# Patient Record
Sex: MALE | Race: WHITE
[De-identification: names, ages, dates, MRNs, and addresses within clinical notes are randomized per-mention and may not be internally consistent; named-entity substitution may affect disease eponyms.]

---

## 2018-02-04 ENCOUNTER — HOSPITAL ENCOUNTER (OUTPATIENT)
Dept: HOSPITAL 80 - FED | Age: 53
Setting detail: OBSERVATION
LOS: 2 days | Discharge: HOME | End: 2018-02-06
Attending: INTERNAL MEDICINE | Admitting: INTERNAL MEDICINE
Payer: COMMERCIAL

## 2018-02-04 DIAGNOSIS — J10.1: Primary | ICD-10-CM

## 2018-02-04 DIAGNOSIS — F17.200: ICD-10-CM

## 2018-02-04 DIAGNOSIS — J44.1: ICD-10-CM

## 2018-02-04 DIAGNOSIS — F20.9: ICD-10-CM

## 2018-02-04 DIAGNOSIS — J96.01: ICD-10-CM

## 2018-02-04 LAB — PLATELET # BLD: 121 10^3/UL (ref 150–400)

## 2018-02-04 PROCEDURE — G0378 HOSPITAL OBSERVATION PER HR: HCPCS

## 2018-02-04 PROCEDURE — 71046 X-RAY EXAM CHEST 2 VIEWS: CPT

## 2018-02-04 NOTE — EDPHY
H & P


Stated Complaint: resp diff.


Source: Patient, Family (Mother)


Exam Limitations: No limitations





- Personal History


Current Tetanus/Diphtheria Vaccine: Unsure


Current Tetanus Diphtheria and Acellular Pertussis (TDAP): Unsure





- Medical/Surgical History


Hx Asthma: No


Hx Chronic Respiratory Disease: Yes


Hx Diabetes: No


Hx Cardiac Disease: No


Hx Renal Disease: No


Hx Cirrhosis: No


Hx Alcoholism: No


Hx HIV/AIDS: No


Hx Splenectomy or Spleen Trauma: No


Other PMH: COPD, Bipolar





- Social History


Smoking Status: Heavy smoker


HPI/ROS: 


HPI:  This is a 52-year-old male who presents with





Chief Complaint:  Respiratory infection





Location:  Chest


Quality:  Respiratory infection


Duration:  5 days


Signs and Symptoms:  + shortness of breath at rest, + shortness of breath on 

exertion, + productive cough, no chest pain, no palpitations, no lower 

extremity edema, + wheezing, no orthopnea, no paroxysmal nocturnal dyspnea, no 

fever, no injury/trauma, no hemoptysis, no body aches, no neck stiffness, + 

fatigue


Timing:  Worsening


Severity:  Moderate


Context:  Patient is a heavy tobacco user presents with 5 day history of 

worsening productive coughing episodes, dyspnea and generalized fatigue.  

Denies any chest pain/palpitations/lower extremity edema. Has an Albuterol 

inhaler at home but does not use consistently as he reports that "it does not 

work."  Does not see a physician regularly.  Did not receive influenza 

vaccination this year.  Reports generalized malaise and decreased appetite.  

Mother reports he only drinks coffee daily.  Patient's biggest complaint is the 

inability to cough up mucus.  No official diagnosis of COPD.  No prior 

hospitalizations for COPD exacerbation and no previous intubations secondary to 

respiratory distress.


Modifying Factors:  None





Comment: 








ROS: see HPI


Constitutional: No fever, + chills, no weight loss


Eyes:  No blurred vision


Respiratory:  + shortness of breath,+ cough


Cardiovascular:  No chest pain, no palpitations, no lower extremity edema 


Gastrointestinal:  No nausea, no vomiting, no diarrhea 


Genitourinary:  No dysuria 


Extremities:  No myalgias 


Neurologic:  No weakness, no numbness


Skin:  No rashes


Hematologic:  No bruising, no bleeding





MEDICAL/SURGICAL/SOCIAL HISTORY: 


Medical history:  Tobacco user.  Does not take any regular medications.


Surgical history:  Denies


Social history:  Mother at bedside. 








CONSTITUTIONAL:  Adult white male who appears older than stated age, smells 

heavily of tobacco, awake and alert, no obvious distress


HEENT: Atraumatic and normocephalic, PERRL, EOMI. Tympanic membranes clear. 

Oropharynx clear, no exudate and moist pink mucosa.  Airway patent.  No 

lymphadenopathy.  No meningismus.


Cardiovascular: Normal S1/S2, tachycardia, regular rhythm, without murmur rub 

or gallop.


PULMONARY/CHEST:  Symmetrical and nontender. Tachypnea; faint expiratory 

wheezing heard throughout.  Purse lipped breathing; prolonged expiratory phase.

  fair air movement. No accessory muscle usage.


ABDOMEN:  Soft, nondistended, nontender, no rebound, no guarding, no peritoneal 

signs, no masses or organomegaly. No CVAT.


EXTREMITIES:  2/2 pulses, strength 5/5, no deformities, no clubbing, no 

cyanosis or edema. No calf tenderness. 


NEUROLOGICAL: no focal neuro deficits.  GCS 15.


SKIN: Warm and dry, no erythema. no rash.  Good capillary refill. 


  


 (Lauryn Liu)


Constitutional: 





 Initial Vital Signs











Temperature (C)  37.6 C   02/04/18 22:43


 


Heart Rate  142 H  02/04/18 22:43


 


Respiratory Rate  20   02/04/18 22:43


 


Blood Pressure  120/82 H  02/04/18 22:43


 


O2 Sat (%)  89 L  02/04/18 22:43








 











O2 Delivery Mode               Nasal Cannula


 


O2 (L/minute)                  3














Allergies/Adverse Reactions: 


 





haloperidol [From Haldol] Allergy (Verified 02/04/18 22:45)


 











Medical Decision Making





- Diagnostics


Imaging Results: 





 Imaging Impressions





Chest X-Ray  02/04/18 23:14


Impression: Mild airways disease. No pneumonia or effusion..  











ED Course/Re-evaluation: 


Labs, IV fluids, nebulizer therapy, IV medications, influenza, chest x-ray 

ordered


Vital signs reviewed upon arrival:  O2 sats 88% on room air with a heart rate 

of 140


Placed on 3 L nasal cannula. 


Will evaluate for influenza versus COPD exacerbation versus pneumonia


Patient given 3 L normal saline, IV Solu-Medrol, DuoNeb, Tessalon Perles and 

p.o. Norco with minimal relief


Reassessed patient 1 hr later and heart rate now 115-120. 


Chest x-ray my read shows no signs of opacity, effusion, pneumothorax, widened 

mediastinum.


Influenza B positive:  Tamiflu candidate. 


Patient removed from 3 L nasal cannula; dropped down to 85% on room air at 

rest.   


Patient has [facility] but prefers to stay at our facility.  Called [facility] and they 

notify me that patient has the type of insurance were he can choose to go to 

any facility and he may remain at [facility]. 


0015: ED to consult for admission:  Spoke with Dr. Ovalles who kindly 

agrees to admit patient and provide further care. 











This patient was seen under the supervision of my secondary supervising 

physician.  I evaluated care for this patient independently.  











 (Lauryn Liu)


PHYSICIAN DOCUMENTATION:


The patient was evaluated and managed by the Physician Assistant.  My co-

signature indicates that I have reviewed this chart and I agree with the 

findings and plan of care as documented.  I am the secondary supervising 

physician.





 (Kortney Carcamo)


Differential Diagnosis: 


Shortness of breath including but not limited to pulmonary infectious process, 

COPD, asthma, pulmonary embolus and congestive heart failure.


 (Lauryn Liu)





- Data Points


Laboratory Results: 





 Laboratory Results





 02/04/18 23:15 





 02/04/18 23:15 





 











  02/04/18 02/04/18 02/04/18





  23:16 23:15 23:15


 


WBC      





    


 


RBC      





    


 


Hgb      





    


 


Hct      





    


 


MCV      





    


 


MCH      





    


 


MCHC      





    


 


RDW      





    


 


Plt Count      





    


 


MPV      





    


 


Neut % (Auto)      





    


 


Lymph % (Auto)      





    


 


Mono % (Auto)      





    


 


Eos % (Auto)      





    


 


Baso % (Auto)      





    


 


Nucleat RBC Rel Count      





    


 


Absolute Neuts (auto)      





    


 


Absolute Lymphs (auto)      





    


 


Absolute Monos (auto)      





    


 


Absolute Eos (auto)      





    


 


Absolute Basos (auto)      





    


 


Absolute Nucleated RBC      





    


 


Immature Gran %      





    


 


Immature Gran #      





    


 


VBG Lactic Acid    0.7 mmol/L mmol/L  





    (0.7-2.1)  


 


Sodium      133 mEq/L L mEq/L





     (135-145) 


 


Potassium      4.5 mEq/L mEq/L





     (3.5-5.2) 


 


Chloride      96 mEq/L L mEq/L





     () 


 


Carbon Dioxide      25 mEq/l mEq/l





     (22-31) 


 


Anion Gap      12 mEq/L mEq/L





     (8-16) 


 


BUN      21 mg/dL mg/dL





     (7-23) 


 


Creatinine      1.3 mg/dL mg/dL





     (0.7-1.3) 


 


Estimated GFR      58 





    


 


Glucose      118 mg/dL H mg/dL





     () 


 


Calcium      8.6 mg/dL mg/dL





     (8.5-10.4) 


 


Troponin I      < 0.012 ng/mL ng/mL





     (0.000-0.034) 


 


NT-Pro-B Natriuret Pep      42 pg/mL pg/mL





     (0-125) 


 


Nasal Influenza A PCR  NEGATIVE FOR FLU A     





   (NEGATIVE)   


 


Nasal Influenza B PCR  FLU B DETECTED  H     





   (NEGATIVE)   














  02/04/18





  23:15


 


WBC  3.92 10^3/uL 10^3/uL





   (3.80-9.50) 


 


RBC  4.66 10^6/uL 10^6/uL





   (4.40-6.38) 


 


Hgb  15.2 g/dL g/dL





   (13.7-17.5) 


 


Hct  42.0 % %





   (40.0-51.0) 


 


MCV  90.1 fL fL





   (81.5-99.8) 


 


MCH  32.6 pg pg





   (27.9-34.1) 


 


MCHC  36.2 g/dL g/dL





   (32.4-36.7) 


 


RDW  12.6 % %





   (11.5-15.2) 


 


Plt Count  121 10^3/uL L 10^3/uL





   (150-400) 


 


MPV  10.4 fL fL





   (8.7-11.7) 


 


Neut % (Auto)  71.2 % %





   (39.3-74.2) 


 


Lymph % (Auto)  16.1 % %





   (15.0-45.0) 


 


Mono % (Auto)  11.7 % %





   (4.5-13.0) 


 


Eos % (Auto)  0.0 % L %





   (0.6-7.6) 


 


Baso % (Auto)  0.5 % %





   (0.3-1.7) 


 


Nucleat RBC Rel Count  0.0 % %





   (0.0-0.2) 


 


Absolute Neuts (auto)  2.79 10^3/uL 10^3/uL





   (1.70-6.50) 


 


Absolute Lymphs (auto)  0.63 10^3/uL L 10^3/uL





   (1.00-3.00) 


 


Absolute Monos (auto)  0.46 10^3/uL 10^3/uL





   (0.30-0.80) 


 


Absolute Eos (auto)  0.00 10^3/uL L 10^3/uL





   (0.03-0.40) 


 


Absolute Basos (auto)  0.02 10^3/uL 10^3/uL





   (0.02-0.10) 


 


Absolute Nucleated RBC  0.00 10^3/uL 10^3/uL





   (0-0.01) 


 


Immature Gran %  0.5 % %





   (0.0-1.1) 


 


Immature Gran #  0.02 10^3/uL 10^3/uL





   (0.00-0.10) 


 


VBG Lactic Acid  





  


 


Sodium  





  


 


Potassium  





  


 


Chloride  





  


 


Carbon Dioxide  





  


 


Anion Gap  





  


 


BUN  





  


 


Creatinine  





  


 


Estimated GFR  





  


 


Glucose  





  


 


Calcium  





  


 


Troponin I  





  


 


NT-Pro-B Natriuret Pep  





  


 


Nasal Influenza A PCR  





  


 


Nasal Influenza B PCR  





  











Medications Given: 





 





Albuterol/Ipratropium (Duoneb)  3 ml IH QID OLEG


   Stop: 08/04/18 05:59


   Last Admin: 02/05/18 06:04 Dose:  3 ml





Discontinued Medications





Hydrocodone Bitart/Acetaminophen (Norco 5/325)  2 tab PO EDNOW ONE


   Stop: 02/04/18 23:22


   Last Admin: 02/04/18 23:32 Dose:  2 tab


Albuterol/Ipratropium (Duoneb)  3 ml IH EDNOW ONE


   Stop: 02/04/18 23:09


   Last Admin: 02/04/18 23:10 Dose:  3 ml


Azithromycin (Zithromax)  500 mg PO ONCE ONE


   PRN Reason: Protocol


   Stop: 02/05/18 01:31


   Last Admin: 02/05/18 02:15 Dose:  500 mg


Benzonatate (Tessalon Pearles)  200 mg PO EDNOW ONE


   Stop: 02/04/18 23:22


   Last Admin: 02/04/18 23:32 Dose:  200 mg


Sodium Chloride (Ns)  1,000 mls @ 0 mls/hr IV ONCE ONE


   PRN Reason: Wide Open


   Stop: 02/04/18 23:11


   Last Admin: 02/04/18 23:11 Dose:  1,000 mls


Sodium Chloride (Ns)  1,000 mls @ 0 mls/hr IV EDNOW ONE; Wide Open


   PRN Reason: Protocol


   Stop: 02/04/18 23:23


   Last Admin: 02/04/18 23:31 Dose:  1,000 mls


Sodium Chloride (Ns)  1,000 mls @ 0 mls/hr IV EDNOW ONE; Wide Open


   PRN Reason: Protocol


   Stop: 02/05/18 00:09


   Last Admin: 02/05/18 00:30 Dose:  1,000 mls


Methylprednisolone Sodium Succinate (Solu-Medrol)  125 mg IVP EDNOW ONE


   Stop: 02/04/18 23:20


   Last Admin: 02/04/18 23:20 Dose:  125 mg


Oseltamivir Phosphate (Tamiflu)  75 mg PO EDNOW ONE


   Stop: 02/05/18 00:07


   Last Admin: 02/05/18 00:30 Dose:  75 mg








Departure





- Departure


Disposition: [facility]


Clinical Impression: 


 Influenza B, COPD with exacerbation, Hypoxia





Condition: Fair

## 2018-02-05 LAB — PLATELET # BLD: 99 10^3/UL (ref 150–400)

## 2018-02-05 RX ADMIN — IPRATROPIUM BROMIDE AND ALBUTEROL SULFATE SCH: .5; 3 SOLUTION RESPIRATORY (INHALATION) at 15:15

## 2018-02-05 RX ADMIN — IPRATROPIUM BROMIDE AND ALBUTEROL SULFATE SCH ML: .5; 3 SOLUTION RESPIRATORY (INHALATION) at 20:13

## 2018-02-05 RX ADMIN — ENOXAPARIN SODIUM SCH: 100 INJECTION SUBCUTANEOUS at 09:39

## 2018-02-05 RX ADMIN — OSELTAMIVIR PHOSPHATE SCH MG: 75 CAPSULE ORAL at 09:36

## 2018-02-05 RX ADMIN — AZITHROMYCIN SCH MG: 250 TABLET, FILM COATED ORAL at 09:36

## 2018-02-05 RX ADMIN — IPRATROPIUM BROMIDE AND ALBUTEROL SULFATE SCH ML: .5; 3 SOLUTION RESPIRATORY (INHALATION) at 06:04

## 2018-02-05 RX ADMIN — TRIHEXYPHENIDYL HYDROCHLORIDE SCH MG: 2 TABLET ORAL at 17:59

## 2018-02-05 RX ADMIN — OSELTAMIVIR PHOSPHATE SCH MG: 75 CAPSULE ORAL at 17:59

## 2018-02-05 RX ADMIN — IPRATROPIUM BROMIDE AND ALBUTEROL SULFATE SCH ML: .5; 3 SOLUTION RESPIRATORY (INHALATION) at 11:02

## 2018-02-05 NOTE — HOSPPROG
Hospitalist Progress Note


Assessment/Plan: 





# Acute  COPD with acute exacerbation - 2/2 influenza infection. Wheezing 

persists this am - coughing improved per patient


   Oxygen saturation 90% on 3 L- baseline no O2 at home


   - Duonebs QID + albuterol PRN


   - Azithromycin and prednisone for 5 days


   - Tamiflu 75 mg PO BID for 5 days





# acute influenza B- continue Tamiflu





# AHRF - 2/2 above, noted to be in mid 80's upon ambulation in the ED.


    chest x-ray (personally reviewed and interpreted) no acute infiltrate


   - Acute treatment as above


   - IS, wean O2 as able


#  Schizophrenia - Appears well compensated. He takes fluphenazine, Zyprexa, 

and Artane as outpatient.





# hypovolemic hyponatremia sodium 133 on presentation improved to the 140s with 

fluid resuscitation


Diet - Regular


Code - Full


Ppx - LMWH


Dispo - Admit to observation status





I have discussed the case with the RN- anticipate disposition tomorrow patient 

continues to improve throughout the day


Subjective: cough improved


Objective: 


 Vital Signs











Temp Pulse Resp BP Pulse Ox


 


 36.7 C   101 H  20   117/74   91 L


 


 02/05/18 16:03  02/05/18 16:03  02/05/18 16:03  02/05/18 16:03  02/05/18 16:03








 Laboratory Results





 02/05/18 05:07 





 02/05/18 05:07 





 











 02/04/18 02/05/18 02/06/18





 05:59 05:59 05:59


 


Intake Total  3200 


 


Balance  3200 














- Physical Exam


Constitutional: no apparent distress


Eyes: anicteric sclera


Ears, Nose, Mouth, Throat: moist mucous membranes


Cardiovascular: regular rate and rhythym


Respiratory: expiratory wheeze


Gastrointestinal: normoactive bowel sounds


Genitourinary: no bladder fullness


Skin: warm


Musculoskeletal: No asymmetric calves


Neurologic: AAOx3


Psychiatric: flat affect, poor insight


Lymph, Heme, Immunologic: no cervical LAD





ICD10 Worksheet


Patient Problems: 


 Problems











Problem Status Onset


 


COPD with exacerbation Acute  


 


Hypoxia Acute  


 


Influenza B Acute

## 2018-02-05 NOTE — PDGENHP
History and Physical





- Chief Complaint


Shortness of breath





- History of Present Illness


51 yo M w/ COPD and schizophrenia presents w/ shortness of breath. Patient has 

had URI symptoms (cough, congestion, SOB) for 4 days. These symptoms progressed 

until today when his shortness of breath and wheezing became worse. In the ED 

he was noted to flu positive with an audible wheeze. Additionally, he was 

hypoxic to mid 80's upon ambulation so he was admitted for further treatment.





History Information





- Allergies/Home Medication List


Allergies/Adverse Reactions: 








haloperidol [From Haldol] Allergy (Verified 02/04/18 22:45)


 





I have personally reviewed and updated: family history, medical history





- Past Medical History


COPD


Additional medical history: Schizophrenia





- Surgical History


Additional surgical history: Diverticulitis a few years ago w/ drain placement





- Family History


Additional family history: Asked, denies





- Social History


Smoking Status: Heavy smoker





Review of Systems


Review of Systems: 





ROS: 10pt was reviewed & negative except for what was stated in HPI & below





Physical Exam


Physical Exam: 

















Temp Pulse Resp BP Pulse Ox


 


 36.8 C   104 H  20   122/78 H  89 L


 


 02/05/18 01:51  02/05/18 01:51  02/05/18 01:51  02/05/18 01:51  02/05/18 01:51




















O2 (L/minute)                  2














Constitutional: no apparent distress, not in pain


Eyes: PERRL, EOMI


Ears, Nose, Mouth, Throat: moist mucous membranes, no oral mucosal ulcers


Cardiovascular: regular rate and rhythym, no murmur, rub, or gallop


Respiratory: no respiratory distress, reduced air movement, No expiratory wheeze


Gastrointestinal: normoactive bowel sounds, soft, non-tender abdomen


Skin: warm, normal color


Musculoskeletal: full muscle strength, no muscle tenderness


Neurologic: AAOx3, CN II-XII Intact


Psychiatric: interacting appropriately, not anxious





Lab Data & Imaging Review





 02/04/18 23:15





 02/04/18 23:15














WBC  3.92 10^3/uL (3.80-9.50)   02/04/18  23:15    


 


RBC  4.66 10^6/uL (4.40-6.38)   02/04/18  23:15    


 


Hgb  15.2 g/dL (13.7-17.5)   02/04/18  23:15    


 


Hct  42.0 % (40.0-51.0)   02/04/18  23:15    


 


MCV  90.1 fL (81.5-99.8)   02/04/18  23:15    


 


MCH  32.6 pg (27.9-34.1)   02/04/18  23:15    


 


MCHC  36.2 g/dL (32.4-36.7)   02/04/18  23:15    


 


RDW  12.6 % (11.5-15.2)   02/04/18  23:15    


 


Plt Count  121 10^3/uL (150-400)  L  02/04/18  23:15    


 


MPV  10.4 fL (8.7-11.7)   02/04/18  23:15    


 


Neut % (Auto)  71.2 % (39.3-74.2)   02/04/18  23:15    


 


Lymph % (Auto)  16.1 % (15.0-45.0)   02/04/18  23:15    


 


Mono % (Auto)  11.7 % (4.5-13.0)   02/04/18  23:15    


 


Eos % (Auto)  0.0 % (0.6-7.6)  L  02/04/18  23:15    


 


Baso % (Auto)  0.5 % (0.3-1.7)   02/04/18  23:15    


 


Nucleat RBC Rel Count  0.0 % (0.0-0.2)   02/04/18  23:15    


 


Absolute Neuts (auto)  2.79 10^3/uL (1.70-6.50)   02/04/18  23:15    


 


Absolute Lymphs (auto)  0.63 10^3/uL (1.00-3.00)  L  02/04/18  23:15    


 


Absolute Monos (auto)  0.46 10^3/uL (0.30-0.80)   02/04/18  23:15    


 


Absolute Eos (auto)  0.00 10^3/uL (0.03-0.40)  L  02/04/18  23:15    


 


Absolute Basos (auto)  0.02 10^3/uL (0.02-0.10)   02/04/18  23:15    


 


Absolute Nucleated RBC  0.00 10^3/uL (0-0.01)   02/04/18  23:15    


 


Immature Gran %  0.5 % (0.0-1.1)   02/04/18  23:15    


 


Immature Gran #  0.02 10^3/uL (0.00-0.10)   02/04/18  23:15    


 


VBG Lactic Acid  0.7 mmol/L (0.7-2.1)   02/04/18  23:15    


 


Sodium  133 mEq/L (135-145)  L  02/04/18  23:15    


 


Potassium  4.5 mEq/L (3.5-5.2)   02/04/18  23:15    


 


Chloride  96 mEq/L ()  L  02/04/18  23:15    


 


Carbon Dioxide  25 mEq/l (22-31)   02/04/18  23:15    


 


Anion Gap  12 mEq/L (8-16)   02/04/18  23:15    


 


BUN  21 mg/dL (7-23)   02/04/18  23:15    


 


Creatinine  1.3 mg/dL (0.7-1.3)   02/04/18  23:15    


 


Estimated GFR  58   02/04/18  23:15    


 


Glucose  118 mg/dL ()  H  02/04/18  23:15    


 


Calcium  8.6 mg/dL (8.5-10.4)   02/04/18  23:15    


 


Troponin I  < 0.012 ng/mL (0.000-0.034)   02/04/18  23:15    


 


NT-Pro-B Natriuret Pep  42 pg/mL (0-125)   02/04/18  23:15    


 


Nasal Influenza A PCR  NEGATIVE FOR FLU A  (NEGATIVE)   02/04/18  23:16    


 


Nasal Influenza B PCR  FLU B DETECTED  (NEGATIVE)  H  02/04/18  23:16    








Imaging Review: 





 Imaging Impressions





Chest X-Ray  02/04/18 23:14


Impression: Mild airways disease. No pneumonia or effusion..  














Assessment & Plan


Assessment: 








51 yo M w/ COPD and schizophrenia presents with AHRF 2/2 COPD exacerbation from 

flu infection.








Plan: 


1. COPD with acute exacerbation - 2/2 influenza infection. Wheezing noted in ED 

but clear breath sounds by the time of his arrival on the floor. He follows 

with [facility] and takes Advair daily with albuterol PRN.


- Duonebs QID + albuterol PRN


- Azithromycin and prednisone for 5 days


- Tamiflu 75 mg PO BID for 5 days


2. AHRF - 2/2 above, noted to be in mid 80's upon ambulation in the ED.


- Acute treatment as above


- IS, wean O2 as able


3. Schizophrenia - Appears well compensated. He takes fluphenazine, Zyprexa, 

and Artane as outpatient.





Diet - Regular


Code - Full


Ppx - LMWH


Dispo - Admit to observation status

## 2018-02-05 NOTE — ASMTCMCOM
CM Note

 

CM Note                       

Notes:

Spoke with hospitalist, patient here for a COPD exacerbation and will likely discharge tomorrow. No 


therapies have been ordered, so I anticipate an independent discharge. CM available if needs 

arise. 

 

Date Signed:  02/05/2018 04:55 PM

Electronically Signed By:Eri Singh RN

## 2018-02-06 VITALS — DIASTOLIC BLOOD PRESSURE: 89 MMHG | TEMPERATURE: 98.2 F | SYSTOLIC BLOOD PRESSURE: 129 MMHG

## 2018-02-06 VITALS — OXYGEN SATURATION: 86 % | HEART RATE: 113 BPM | RESPIRATION RATE: 20 BRPM

## 2018-02-06 RX ADMIN — ENOXAPARIN SODIUM SCH: 100 INJECTION SUBCUTANEOUS at 10:48

## 2018-02-06 RX ADMIN — IPRATROPIUM BROMIDE AND ALBUTEROL SULFATE SCH ML: .5; 3 SOLUTION RESPIRATORY (INHALATION) at 05:23

## 2018-02-06 RX ADMIN — AZITHROMYCIN SCH MG: 250 TABLET, FILM COATED ORAL at 09:20

## 2018-02-06 RX ADMIN — IPRATROPIUM BROMIDE AND ALBUTEROL SULFATE SCH: .5; 3 SOLUTION RESPIRATORY (INHALATION) at 12:02

## 2018-02-06 RX ADMIN — OSELTAMIVIR PHOSPHATE SCH MG: 75 CAPSULE ORAL at 09:19

## 2018-02-06 RX ADMIN — TRIHEXYPHENIDYL HYDROCHLORIDE SCH MG: 2 TABLET ORAL at 09:19

## 2018-02-06 NOTE — PDHOMEO2F
Home Oxygen Face to Face


Home Orders: 


I certify that a physician or a nurse practitioner or physician's assistant has 

had a face-to-face encounter with this patient on the date of this order due to 

the diagnosis listed, which relates to the primary reason the patient requires 

home oxygen. Alternative treatments have been tried, or considered, and deemed 

ineffective. It is anticipated that supplemental oxygen will result in 

improvement with treatment.





Home oxygen qualifying diagnosis: influenza and COPD


SpO2 on room air (%): 86 RA


Frequency of home oxygen needed: continuous


Home oxygen liters per minute: 2


Home oxygen delivery device: nasal cannula


Concentrator: Yes


E-tanks for mobility and back up: Yes


If ordering portable O2, is the patient mobile in the home?: Yes


I certify that, based on these findings, the home oxygen is medically necessary 

for this patient for the following length of time.





Length of time home oxygen needed: 1 month

## 2018-02-06 NOTE — GDS
[f rep st]



                                                             DISCHARGE SUMMARY





DISCHARGE DIAGNOSES:  Include:

1.  Acute influenza B.

2.  Acute chronic obstructive pulmonary disease exacerbation.

3.  Acute hypoxic respiratory failure.  

4.  Schizophrenia.



HISTORY OF PRESENT ILLNESS:  This is a 52-year-old male, with known chronic lung disease who presente
d with worsening shortness of breath and cough.  For details of patient's initial presentation, luis carlos quintanilla see the history and physical dated 02/05/2018.



CONSULTATIVE SERVICES:  None.



PROCEDURES:  None.



HOSPITAL COURSE:  

1.  Acute hypoxic respiratory failure thought multifactorial secondary to acute influenza B, as well 
as chronic COPD with acute exacerbation.  Patient was treated with Tamiflu, steroid burst, and supple
mental oxygen with his chronic inhaled beta agonist medications.  He is being discharged to complete 
a full course of Tamiflu, 2 additional days of oral steroid burst, and continued supplemental oxygen 
until cleared to discontinue by his outpatient provider.

2.  Acute influenza B.  Patient was initiated on Tamiflu and did have rapid improvement in his sympto
ms soon after initiation.  He will complete the 10-dose course.

3.  Acute chronic obstructive pulmonary disease exacerbation.  Patient was quite wheezy on initial ho
spitalization.  On the day of disposition, he was wheezing much less, reports improvement in his coug
h and shortness of breath.  He is still requiring 2 L of supplemental oxygen, but we suspect this bryn
l be weanable in the course of the next week or two.  

4.  Schizophrenia.  Patient was continued on his home medications without alteration.



MEDICATIONS AT THE TIME OF DISPOSITION:  Please reference med rec printed on 02/06/2018.



FOLLOWUP APPOINTMENTS:  Include with the primary care provider in the next 7-10 days, post completion
 of Tamiflu and steroid burst for disposition followup and check of the patient's oxygen saturation.



PENDING STUDIES:  At time of dictation are none.



BILLING:  I spent greater than 30 minutes in the planning and coordination of this discharge.





Job #:  [id number]